# Patient Record
Sex: MALE | Race: WHITE | URBAN - METROPOLITAN AREA
[De-identification: names, ages, dates, MRNs, and addresses within clinical notes are randomized per-mention and may not be internally consistent; named-entity substitution may affect disease eponyms.]

---

## 2021-01-21 ENCOUNTER — OFFICE VISIT (OUTPATIENT)
Dept: URBAN - METROPOLITAN AREA CLINIC 27 | Facility: CLINIC | Age: 76
End: 2021-01-21
Payer: MEDICARE

## 2021-01-21 PROCEDURE — 67028 INJECTION EYE DRUG: CPT | Performed by: OPHTHALMOLOGY

## 2021-01-21 PROCEDURE — 92134 CPTRZ OPH DX IMG PST SGM RTA: CPT | Performed by: OPHTHALMOLOGY

## 2021-01-21 PROCEDURE — 92014 COMPRE OPH EXAM EST PT 1/>: CPT | Performed by: OPHTHALMOLOGY

## 2021-01-21 ASSESSMENT — INTRAOCULAR PRESSURE
OS: 16
OD: 17

## 2021-01-21 NOTE — IMPRESSION/PLAN
Impression: Nexdtve age-related mclr degn, left eye, intermed dry stage: H35.3122. OS. Status: Chronic. -FA from 01/09/20 demonstrates no active leakage Plan: Exam and OCT demonstrate stable drusen and RPE changes. The patient was advised to continue AREDS 2 vitamin supplements; the risk of smoking was emphasized with the patient. The patient was also advised to continue to use an [address] to monitor the vision and to call immediately with any changes.

## 2021-01-21 NOTE — IMPRESSION/PLAN
Impression: Exdtve age-rel mclr degn, right eye, with actv chrdl neovas: H35.3211. OD. Status: Symptomatic.
h/o avastin last 1/9/2020, [place] 12[place]
-FA from 01/09/20 demonstrates active leakage s/p Beovu 2/13/2020
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 Discussed PDT vs antiVEGF vs observation. Recommend Eylea today. R/B/A discussed and patient elects to proceed. Intravitreal Eylea injected today OD without complication.   

RTC: 4 weeks re-eval with OCT,OU/poss Eylea OD x AMD

## 2021-01-21 NOTE — IMPRESSION/PLAN
Impression: Dry eye syndrome of bilateral lacrimal glands: H04.123. Plan: Patient notes tearing. Exam demonstrates PEE and BLL ectropion. Discussed R/B/A of ATs, PFATs, Restasis, vs punctal plugs.  Rec ATs

## 2021-01-21 NOTE — IMPRESSION/PLAN
Impression: Age-related nuclear cataract, bilateral: H25.13. Plan: Patient notes glare. Exam demonstrates a mild- moderate cataract which has slowly progressed. Discussed R/B/A of surgery vs observation. Recommend observation. Warning symptoms discussed.

## 2021-01-21 NOTE — IMPRESSION/PLAN
Impression: Ptosis of eyelid: H02.409. Plan: Discussed that this may be starting to become visually significant; observe for now.

## 2021-02-18 ENCOUNTER — OFFICE VISIT (OUTPATIENT)
Dept: URBAN - METROPOLITAN AREA CLINIC 27 | Facility: CLINIC | Age: 76
End: 2021-02-18
Payer: MEDICARE

## 2021-02-18 PROCEDURE — 92134 CPTRZ OPH DX IMG PST SGM RTA: CPT | Performed by: OPHTHALMOLOGY

## 2021-02-18 PROCEDURE — 92012 INTRM OPH EXAM EST PATIENT: CPT | Performed by: OPHTHALMOLOGY

## 2021-02-18 PROCEDURE — 67028 INJECTION EYE DRUG: CPT | Performed by: OPHTHALMOLOGY

## 2021-02-18 ASSESSMENT — INTRAOCULAR PRESSURE
OS: 13
OD: 12

## 2021-02-18 NOTE — IMPRESSION/PLAN
Impression: Exdtve age-rel mclr degn, right eye, with actv chrdl neovas: H35.3211. OD. Status: Symptomatic.
h/o avastin last 1/9/2020, [place]
-FA from 01/09/20 demonstrates active leakage s/p Beovu 2/13/2020
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 Discussed PDT vs antiVEGF vs observation. Recommend Eylea today. R/B/A discussed and patient elects to proceed. Intravitreal Eylea injected today OD without complication.   

RTC: 4-5 weeks re-eval with OCT,OU/poss Eylea OD x AMD

## 2021-02-18 NOTE — IMPRESSION/PLAN
Impression: Nexdtve age-related mclr degn, left eye, intermed dry stage: H35.3122. OS. Status: Chronic. -FA from 01/09/20 demonstrates no active leakage Plan: Patient notes distortion in vision since last visit. Exam and OCT demonstrate stable drusen and RPE changes confirming AMD is still dry. The patient was advised to continue AREDS 2 vitamin supplements; the risk of smoking was emphasized with the patient. The patient was also advised to continue to use an [address] to monitor the vision and to call immediately with any changes. Will closely monitor given suspicious findings on OCT.

## 2021-02-18 NOTE — IMPRESSION/PLAN
Impression: Ptosis of eyelid: H02.409. Plan: Patient is noticing improvement in vision with manually lifting his eyelids. Discussed surgery vs observation.   Recommend evaluation with oculoplastics (either in [place] or when he comes back in the winter)

## 2021-02-18 NOTE — IMPRESSION/PLAN
Impression: Dry eye syndrome of bilateral lacrimal glands: H04.123. Plan: Patient notes improved tearing. Exam demonstrates PEE and BLL ectropion. Discussed R/B/A of ATs, PFATs, Restasis, vs punctal plugs.  Rec cont ATs

## 2021-03-24 ENCOUNTER — OFFICE VISIT (OUTPATIENT)
Dept: URBAN - METROPOLITAN AREA CLINIC 41 | Facility: CLINIC | Age: 76
End: 2021-03-24
Payer: MEDICARE

## 2021-03-24 PROCEDURE — 92012 INTRM OPH EXAM EST PATIENT: CPT | Performed by: OPHTHALMOLOGY

## 2021-03-24 PROCEDURE — 92134 CPTRZ OPH DX IMG PST SGM RTA: CPT | Performed by: OPHTHALMOLOGY

## 2021-03-24 PROCEDURE — 67028 INJECTION EYE DRUG: CPT | Performed by: OPHTHALMOLOGY

## 2021-03-24 ASSESSMENT — INTRAOCULAR PRESSURE
OS: 19
OD: 18

## 2021-03-24 NOTE — IMPRESSION/PLAN
Impression: Exdtve age-rel mclr degn, right eye, with actv chrdl neovas: H35.3211. OD. Status: Symptomatic.
h/o avastin last 1/9/2020, [place]
-FA from 01/09/20 demonstrates active leakage s/p Beovu 2/13/2020
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 Discussed PDT vs antiVEGF vs observation. Recommend Eylea today. R/B/A discussed and patient elects to proceed. Intravitreal Eylea injected today OD without complication.   

RTC: 4-5 weeks re-eval with OCT,OU/poss Eylea OD x AMD

## 2021-03-24 NOTE — IMPRESSION/PLAN
Impression: Nexdtve age-related mclr degn, left eye, intermed dry stage: H35.3122. OS. Status: Chronic. -FA from 01/09/20 demonstrates no active leakage Plan: Patient notes distortion in vision since last visit on 2/18/2021. Exam and OCT demonstrate stable drusen and RPE changes confirming AMD is still dry. The patient was advised to continue AREDS 2 vitamin supplements; the risk of smoking was emphasized with the patient. The patient was also advised to continue to use an [address] to monitor the vision and to call immediately with any changes. Will closely monitor given suspicious findings on OCT.

## 2021-03-24 NOTE — IMPRESSION/PLAN
Impression: Age-related nuclear cataract, bilateral: H25.13. Plan: Patient notes glare and vision tested worse today OD. Exam demonstrates a moderate cataract which has slowly progressed. Discussed R/B/A of surgery vs observation. Recommend observation. Warning symptoms discussed.

## 2022-02-03 ENCOUNTER — OFFICE VISIT (OUTPATIENT)
Dept: URBAN - METROPOLITAN AREA CLINIC 27 | Facility: CLINIC | Age: 77
End: 2022-02-03
Payer: MEDICARE

## 2022-02-03 DIAGNOSIS — H25.13 AGE-RELATED NUCLEAR CATARACT, BILATERAL: ICD-10-CM

## 2022-02-03 PROCEDURE — 67028 INJECTION EYE DRUG: CPT | Performed by: OPHTHALMOLOGY

## 2022-02-03 PROCEDURE — 92014 COMPRE OPH EXAM EST PT 1/>: CPT | Performed by: OPHTHALMOLOGY

## 2022-02-03 ASSESSMENT — INTRAOCULAR PRESSURE
OD: 12
OS: 13

## 2022-02-03 NOTE — IMPRESSION/PLAN
Impression: Nexdtve age-related mclr degn, left eye, intermed dry stage: H35.3122. OS. Status: Chronic. -FA from 01/09/20 demonstrates no active leakage Plan: Patient notes distortion in vision since last visit on 2/18/2021. Exam and OCT demonstrate stable drusen and RPE changes confirming AMD is still dry. The patient was advised to continue AREDS 2 vitamin supplements; the risk of smoking was emphasized with the patient. The patient was also advised to continue to use an [address] to monitor the vision and to call immediately with any changes. Will closely monitor given suspicious findings on OCT.

## 2022-02-03 NOTE — IMPRESSION/PLAN
Impression: Exdtve age-rel mclr degn, right eye, with actv chrdl neovas: H35.3211. OD. Status: Symptomatic. -FA from 01/09/20 demonstrates active leakage
-[place]
-s/p Avastin last 1/9/2020-RCA 
-s/p Beovu last 02/13/2020-RCA
-s/p Eylea [place] 12/23/2021[place]
-s/p Eylea last 03/24/2021-RCA Plan: Examination and OCT confirm with worse PED and SRF at ~ 6 wk tx interval.   Discussed PDT vs antiVEGF vs observation. Recommend Eylea today and taper to 4 wk follow up. R/B/A discussed and patient elects to proceed. Intravitreal Eylea injected today OD without complication.   

RTC: 4 weeks re-eval with OCT,OU/poss Eylea OD x AMD

## 2022-03-04 ENCOUNTER — OFFICE VISIT (OUTPATIENT)
Dept: URBAN - METROPOLITAN AREA CLINIC 41 | Facility: CLINIC | Age: 77
End: 2022-03-04
Payer: MEDICARE

## 2022-03-04 DIAGNOSIS — H35.3122 NEXDTVE AGE-RELATED MCLR DEGN, LEFT EYE, INTERMED DRY STAGE: ICD-10-CM

## 2022-03-04 DIAGNOSIS — H02.409 PTOSIS OF EYELID: ICD-10-CM

## 2022-03-04 DIAGNOSIS — T26.61XA CHEMICAL BURN OF CORNEA OF RIGHT EYE, INITIAL ENCOUNTER: ICD-10-CM

## 2022-03-04 PROCEDURE — 67028 INJECTION EYE DRUG: CPT | Performed by: OPHTHALMOLOGY

## 2022-03-04 PROCEDURE — 99214 OFFICE O/P EST MOD 30 MIN: CPT | Performed by: OPHTHALMOLOGY

## 2022-03-04 PROCEDURE — 92134 CPTRZ OPH DX IMG PST SGM RTA: CPT | Performed by: OPHTHALMOLOGY

## 2022-03-04 ASSESSMENT — INTRAOCULAR PRESSURE
OS: 18
OD: 17
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
## 2022-03-04 NOTE — IMPRESSION/PLAN
Impression: Nexdtve age-related mclr degn, left eye, intermed dry stage: H35.3122. OS. Status: Chronic. -FA from 01/09/20 demonstrates no active leakage Plan: Patient notes distortion in vision since last visit on 2/18/2021. Exam and OCT demonstrate stable drusen and RPE changes confirming AMD is still dry. The patient was advised to continue AREDS 2 vitamin supplements; the risk of smoking was emphasized with the patient. The patient was also advised to continue to use an [address] to monitor the vision and to call immediately with any changes. Will closely monitor given suspicious findings on OCT.

## 2022-03-04 NOTE — IMPRESSION/PLAN
Impression: Exdtve age-rel mclr degn, right eye, with actv chrdl neovas: H35.3211. OD. Status: Symptomatic. -FA from 01/09/20 demonstrates active leakage
-[place]ast[place]
-[place]/p Avastin last 1/9/2020-RCA 
-s/p Beovu last 02/13/2020-RCA
-s/p Eylea [place] 12/23/2021[place]
-s/p Eylea last 02/03/2022-RCA Plan: Examination and OCT confirm with worse PED and SRF at ~ 6 wk tx interval.   Discussed PDT vs antiVEGF vs observation. Recommend Eylea today and taper to 4 wk follow up. R/B/A discussed and patient elects to proceed. Intravitreal Eylea injected today OD without complication.   

RTC: 4 weeks re-eval with OCT,OU/poss Eylea OD x AMD
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
## 2022-04-01 ENCOUNTER — OFFICE VISIT (OUTPATIENT)
Dept: URBAN - METROPOLITAN AREA CLINIC 41 | Facility: CLINIC | Age: 77
End: 2022-04-01
Payer: MEDICARE

## 2022-04-01 DIAGNOSIS — H04.123 DRY EYE SYNDROME OF BILATERAL LACRIMAL GLANDS: ICD-10-CM

## 2022-04-01 DIAGNOSIS — H35.3211 EXDTVE AGE-REL MCLR DEGN, RIGHT EYE, WITH ACTV CHRDL NEOVAS: Primary | ICD-10-CM

## 2022-04-01 PROCEDURE — 92014 COMPRE OPH EXAM EST PT 1/>: CPT | Performed by: OPHTHALMOLOGY

## 2022-04-01 PROCEDURE — 92134 CPTRZ OPH DX IMG PST SGM RTA: CPT | Performed by: OPHTHALMOLOGY

## 2022-04-01 PROCEDURE — 67028 INJECTION EYE DRUG: CPT | Performed by: OPHTHALMOLOGY

## 2022-04-01 ASSESSMENT — INTRAOCULAR PRESSURE
OS: 15
OD: 18

## 2022-04-01 NOTE — IMPRESSION/PLAN
Impression: Exdtve age-rel mclr degn, right eye, with actv chrdl neovas: H35.3211. OD. Status: Symptomatic. -FA from 01/09/20 demonstrates active leakage
-[place]ast[place]
-s/p Avastin last 1/9/2020-RCA 
-s/p Beovu last 02/13/2020-RCA
-s/p Eylea [place] 12/23/2021[place]
-s/p Eylea last 03/04/2022-RCA Plan: Examination and OCT confirm with improved PED and SRF tapering from 6 wks to 4 wk tx interval.   Discussed PDT vs antiVEGF vs observation. Recommend Eylea today and maintain 4 wk follow up until SRF is completely resolved. R/B/A discussed and patient elects to proceed. Intravitreal Eylea injected today OD without complication.   

RTC: 4 weeks re-eval with OCT,OU/poss Eylea OD x AMD (WILL BE BACK HOME IN [place])
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
## 2022-04-01 NOTE — IMPRESSION/PLAN
Impression: Nexdtve age-related mclr degn, left eye, intermed dry stage: H35.3122. OS. Status: Chronic. -FA from 01/09/20 demonstrates no active leakage Plan: Patient notes distortion in vision since last visit on 2/18/2021. Exam and OCT demonstrate stable drusen and RPE changes confirming AMD is still dry. The patient was advised to continue AREDS 2 vitamin supplements; the risk of smoking was emphasized with the patient. The patient was also advised to continue to use an [address] to monitor the vision and to call immediately with any changes. Will closely monitor given suspicious findings on OCT.

## 2022-04-01 NOTE — IMPRESSION/PLAN
Impression: Age-related nuclear cataract, bilateral: H25.13. Plan: Patient notes glare and vision tested better today OD, though still borderline. Exam demonstrates a moderate cataract which has slowly progressed. Discussed R/B/A of surgery vs observation. Recommend observation and evaluation back home in [place]. Warning symptoms discussed.

## 2023-01-13 ENCOUNTER — OFFICE VISIT (OUTPATIENT)
Dept: URBAN - METROPOLITAN AREA CLINIC 41 | Facility: CLINIC | Age: 78
End: 2023-01-13
Payer: MEDICARE

## 2023-01-13 DIAGNOSIS — T26.61XA CHEMICAL BURN OF CORNEA OF RIGHT EYE, INITIAL ENCOUNTER: ICD-10-CM

## 2023-01-13 DIAGNOSIS — H25.13 AGE-RELATED NUCLEAR CATARACT, BILATERAL: ICD-10-CM

## 2023-01-13 DIAGNOSIS — H02.409 PTOSIS OF EYELID: ICD-10-CM

## 2023-01-13 DIAGNOSIS — H35.3122 NEXDTVE AGE-RELATED MCLR DEGN, LEFT EYE, INTERMED DRY STAGE: Primary | ICD-10-CM

## 2023-01-13 DIAGNOSIS — H35.3211 EXDTVE AGE-REL MCLR DEGN, RIGHT EYE, WITH ACTV CHRDL NEOVAS: ICD-10-CM

## 2023-01-13 PROCEDURE — 67028 INJECTION EYE DRUG: CPT | Performed by: OPHTHALMOLOGY

## 2023-01-13 PROCEDURE — 92134 CPTRZ OPH DX IMG PST SGM RTA: CPT | Performed by: OPHTHALMOLOGY

## 2023-01-13 PROCEDURE — 92014 COMPRE OPH EXAM EST PT 1/>: CPT | Performed by: OPHTHALMOLOGY

## 2023-01-13 ASSESSMENT — INTRAOCULAR PRESSURE
OS: 15
OD: 19

## 2023-01-13 NOTE — IMPRESSION/PLAN
Impression: Nexdtve age-related mclr degn, left eye, intermed dry stage: H35.3122. OS. Status: Chronic. -FA from 01/09/20 demonstrates no active leakage Plan: Patient notes distortion in vision since last visit on 2/18/2021. Exam and OCT demonstrate stable drusen and RPE changes confirming AMD is still dry. The patient was advised to continue AREDS 2 vitamin supplements; the risk of smoking was emphasized with the patient. The patient was also advised to continue to use an [address] to monitor the vision and to call immediately with any changes. Will closely monitor given suspicious findings on OCT.

## 2023-01-13 NOTE — IMPRESSION/PLAN
Impression: Exdtve age-rel mclr degn, right eye, with actv chrdl neovas: H35.3211. OD. Status: Symptomatic. -FA from 01/09/20 demonstrates active leakage
-[place]2022[place]
-s/p Eylea last 04/01/2022-RCA
-[place]
-s/p Avastin last 1/9/2020-RCA 
-s/p Beovu last 02/13/2020-RCA Plan: Patient returns from [place]. Last tx ~ 4 wks ago. Examination and OCT confirm with significantly worse SRF. In past, he had improved PED and SRF tapering from 6 wks to 4 wk tx interval.   Discussed PDT vs antiVEGF vs observation. Recommend Eylea today and maintain 4 wk follow up until SRF is completely resolved. R/B/A discussed and patient elects to proceed. Intravitreal Eylea injected today OD without complication.   

RTC: 4 weeks re-eval with OCT,OU/poss Eylea OD vs Vabysmo OD x AMD (WILL BE BACK HOME IN [place])

## 2023-01-13 NOTE — IMPRESSION/PLAN
Impression: Age-related nuclear cataract, bilateral: H25.13. Plan: Patient notes glare and vision tested worse today OD, though still borderline. Exam demonstrates a moderate cataract which has slowly progressed. Discussed R/B/A of surgery vs observation. Recommend observation and evaluation back home in [place]. Warning symptoms discussed.

## 2023-02-13 ENCOUNTER — OFFICE VISIT (OUTPATIENT)
Dept: URBAN - METROPOLITAN AREA CLINIC 41 | Facility: CLINIC | Age: 78
End: 2023-02-13
Payer: MEDICARE

## 2023-02-13 DIAGNOSIS — H35.3211 EXDTVE AGE-REL MCLR DEGN, RIGHT EYE, WITH ACTV CHRDL NEOVAS: ICD-10-CM

## 2023-02-13 DIAGNOSIS — H25.13 AGE-RELATED NUCLEAR CATARACT, BILATERAL: ICD-10-CM

## 2023-02-13 DIAGNOSIS — H35.3122 NEXDTVE AGE-RELATED MCLR DEGN, LEFT EYE, INTERMED DRY STAGE: Primary | ICD-10-CM

## 2023-02-13 DIAGNOSIS — T26.61XA CHEMICAL BURN OF CORNEA OF RIGHT EYE, INITIAL ENCOUNTER: ICD-10-CM

## 2023-02-13 DIAGNOSIS — H02.409 PTOSIS OF EYELID: ICD-10-CM

## 2023-02-13 PROCEDURE — 92012 INTRM OPH EXAM EST PATIENT: CPT | Performed by: OPHTHALMOLOGY

## 2023-02-13 PROCEDURE — 67028 INJECTION EYE DRUG: CPT | Performed by: OPHTHALMOLOGY

## 2023-02-13 PROCEDURE — 92134 CPTRZ OPH DX IMG PST SGM RTA: CPT | Performed by: OPHTHALMOLOGY

## 2023-02-13 ASSESSMENT — INTRAOCULAR PRESSURE
OD: 20
OS: 14

## 2023-02-13 NOTE — IMPRESSION/PLAN
Impression: Exdtve age-rel mclr degn, right eye, with actv chrdl neovas: H35.3211. OD. Status: Symptomatic. -FA from 01/09/20 demonstrates active leakage
-[place]2022[place]
-s/p Eylea last 01/13/2023-RCA
-[place]ast[place]
-s/p Avastin last 1/9/2020-RCA 
-s/p Beovu last 02/13/2020-RCA Plan: Last tx ~ 4 wks ago with Eylea. Examination and OCT confirm with significantly worse SRF on Eylea. In past, he had improved PED and SRF tapering from 6 wks to 4 wk tx interval.   Discussed PDT vs antiVEGF vs observation. Recommend switch to Vabysmo today and maintain 4 wk follow up until SRF is completely resolved. R/B/A discussed and patient elects to proceed. Intravitreal Vabysmo injected today OD without complication.   

RTC: 4-5 weeks re-eval with OCT,OU/poss Eylea OD vs Vabysmo OD x AMD (WILL BE BACK HOME IN [place] for summer)

## 2023-02-13 NOTE — IMPRESSION/PLAN
Impression: Nexdtve age-related mclr degn, left eye, intermed dry stage: H35.3122. OS. Status: Chronic. -FA from 01/09/20 demonstrates no active leakage Plan: Patient notes distortion in vision since last visit on 2/18/2021. Exam and OCT demonstrate stable drusen and RPE changes confirming AMD is still dry. The patient was advised to continue AREDS 2 vitamin supplements; the risk of smoking was emphasized with the patient. The patient was also advised to continue to use an [address] to monitor the vision and to call immediately with any changes. Will closely monitor given suspicious findings on OCT.

## 2023-03-20 ENCOUNTER — OFFICE VISIT (OUTPATIENT)
Dept: URBAN - METROPOLITAN AREA CLINIC 36 | Facility: CLINIC | Age: 78
End: 2023-03-20
Payer: COMMERCIAL

## 2023-03-20 DIAGNOSIS — H02.409 PTOSIS OF EYELID: ICD-10-CM

## 2023-03-20 DIAGNOSIS — H25.13 AGE-RELATED NUCLEAR CATARACT, BILATERAL: ICD-10-CM

## 2023-03-20 DIAGNOSIS — T26.61XA CHEMICAL BURN OF CORNEA OF RIGHT EYE, INITIAL ENCOUNTER: ICD-10-CM

## 2023-03-20 DIAGNOSIS — H35.3122 NEXDTVE AGE-RELATED MCLR DEGN, LEFT EYE, INTERMED DRY STAGE: Primary | ICD-10-CM

## 2023-03-20 DIAGNOSIS — H35.3211 EXDTVE AGE-REL MCLR DEGN, RIGHT EYE, WITH ACTV CHRDL NEOVAS: ICD-10-CM

## 2023-03-20 PROCEDURE — 92134 CPTRZ OPH DX IMG PST SGM RTA: CPT | Performed by: OPHTHALMOLOGY

## 2023-03-20 PROCEDURE — 67028 INJECTION EYE DRUG: CPT | Performed by: OPHTHALMOLOGY

## 2023-03-20 PROCEDURE — 92014 COMPRE OPH EXAM EST PT 1/>: CPT | Performed by: OPHTHALMOLOGY

## 2023-03-20 ASSESSMENT — INTRAOCULAR PRESSURE
OS: 20
OD: 21

## 2023-03-20 NOTE — IMPRESSION/PLAN
Impression: Nexdtve age-related mclr degn, left eye, intermed dry stage: H35.3122. OS. Status: Chronic. -FA from 01/09/20 demonstrates no active leakage Plan: Patient notes distortion in vision since last visit on 2/18/2021. Exam and OCT demonstrate stable drusen and RPE changes confirming AMD is still dry. The patient was advised to continue AREDS 2 vitamin supplements; the risk of smoking was emphasized with the patient. The patient was also advised to continue to use an [address] to monitor the vision and to call immediately with any changes. Will closely monitor given suspicious findings on OCT.

## 2023-03-20 NOTE — IMPRESSION/PLAN
Impression: Age-related nuclear cataract, bilateral: H25.13. Plan: Patient notes glare and vision improved today OD with better control of his AMD.  Exam demonstrates a moderate cataract which has slowly progressed. Discussed R/B/A of surgery vs observation. Recommend observation and evaluation back home in [place]. Warning symptoms discussed.

## 2023-03-20 NOTE — IMPRESSION/PLAN
Impression: Exdtve age-rel mclr degn, right eye, with actv chrdl neovas: H35.3211. OD. Status: Symptomatic. -FA from 01/09/20 demonstrates active leakage
-[place]yle[place]2022[place]
-s/p Eylea last 01/13/2023-RCA
-[place]ast[place]
-s/p Avastin last 1/9/2020-RCA 
-s/p Beovu last 02/13/2020-RCA Plan: Last tx ~ 5 wks ago after switch to Vabysmo. Examination and OCT confirm with resolved SRF after switch to Vabysmo. Discussed PDT vs antiVEGF vs observation. Recommend repeat Vabysmo today and maintain 4-5 wk follow up. R/B/A discussed and patient elects to proceed. Intravitreal Vabysmo injected today OD without complication.   

RTC: 4-5 weeks re-eval with OCT,OU/poss Vabysmo OD x AMD (WILL BE BACK HOME IN [place] for summer)

## 2024-02-12 ENCOUNTER — OFFICE VISIT (OUTPATIENT)
Dept: URBAN - METROPOLITAN AREA CLINIC 41 | Facility: CLINIC | Age: 79
End: 2024-02-12
Payer: COMMERCIAL

## 2024-02-12 DIAGNOSIS — H35.3122 NONEXUDATIVE AGE-RELATED MACULAR DEGENERATION, LEFT EYE, INTERMEDIATE DRY STAGE: Primary | ICD-10-CM

## 2024-02-12 DIAGNOSIS — H02.409 PTOSIS OF EYELID: ICD-10-CM

## 2024-02-12 DIAGNOSIS — H25.13 AGE-RELATED NUCLEAR CATARACT, BILATERAL: ICD-10-CM

## 2024-02-12 DIAGNOSIS — H35.3211 EXUDATIVE AGE-RELATED MACULAR DEGENERATION, RIGHT EYE, WITH ACTIVE CHOROIDAL NEOVASCULARIZATION: ICD-10-CM

## 2024-02-12 PROCEDURE — 92134 CPTRZ OPH DX IMG PST SGM RTA: CPT | Performed by: OPHTHALMOLOGY

## 2024-02-12 PROCEDURE — 67028 INJECTION EYE DRUG: CPT | Performed by: OPHTHALMOLOGY

## 2024-02-12 PROCEDURE — 92014 COMPRE OPH EXAM EST PT 1/>: CPT | Performed by: OPHTHALMOLOGY

## 2024-02-12 ASSESSMENT — INTRAOCULAR PRESSURE
OS: 17
OD: 16

## 2024-03-01 ENCOUNTER — OFFICE VISIT (OUTPATIENT)
Dept: URBAN - METROPOLITAN AREA CLINIC 41 | Facility: CLINIC | Age: 79
End: 2024-03-01
Payer: MEDICARE

## 2024-03-01 DIAGNOSIS — H02.409 PTOSIS OF EYELID: ICD-10-CM

## 2024-03-01 DIAGNOSIS — H35.3211 EXUDATIVE AGE-RELATED MACULAR DEGENERATION, RIGHT EYE, WITH ACTIVE CHOROIDAL NEOVASCULARIZATION: Primary | ICD-10-CM

## 2024-03-01 DIAGNOSIS — H35.3122 NEXDTVE AGE-RELATED MCLR DEGN, LEFT EYE, INTERMED DRY STAGE: ICD-10-CM

## 2024-03-01 DIAGNOSIS — H25.13 AGE-RELATED NUCLEAR CATARACT, BILATERAL: ICD-10-CM

## 2024-03-01 PROCEDURE — 92134 CPTRZ OPH DX IMG PST SGM RTA: CPT | Performed by: OPHTHALMOLOGY

## 2024-03-01 PROCEDURE — 92014 COMPRE OPH EXAM EST PT 1/>: CPT | Performed by: OPHTHALMOLOGY

## 2024-03-01 RX ORDER — MOXIFLOXACIN 5 MG/ML
0.5 % SOLUTION/ DROPS OPHTHALMIC
Qty: 5 | Refills: 2 | Status: INACTIVE
Start: 2024-03-01 | End: 2024-03-01

## 2024-03-01 RX ORDER — PREDNISOLONE ACETATE 10 MG/ML
1 % SUSPENSION/ DROPS OPHTHALMIC
Qty: 5 | Refills: 2 | Status: INACTIVE
Start: 2024-03-01 | End: 2024-03-01

## 2024-03-01 RX ORDER — MOXIFLOXACIN 5 MG/ML
0.5 % SOLUTION/ DROPS OPHTHALMIC
Qty: 5 | Refills: 2 | Status: INACTIVE
Start: 2024-03-01 | End: 2024-06-01

## 2024-03-01 RX ORDER — PREDNISOLONE ACETATE 10 MG/ML
1 % SUSPENSION/ DROPS OPHTHALMIC
Qty: 5 | Refills: 2 | Status: INACTIVE
Start: 2024-03-01 | End: 2024-05-01

## 2024-03-01 ASSESSMENT — INTRAOCULAR PRESSURE
OD: 7
OS: 8

## 2024-03-08 ENCOUNTER — OFFICE VISIT (OUTPATIENT)
Dept: URBAN - METROPOLITAN AREA CLINIC 41 | Facility: CLINIC | Age: 79
End: 2024-03-08
Payer: MEDICARE

## 2024-03-08 DIAGNOSIS — H35.3211 EXUDATIVE AGE-RELATED MACULAR DEGENERATION, RIGHT EYE, WITH ACTIVE CHOROIDAL NEOVASCULARIZATION: Primary | ICD-10-CM

## 2024-03-08 DIAGNOSIS — H02.409 PTOSIS OF EYELID: ICD-10-CM

## 2024-03-08 DIAGNOSIS — H25.13 AGE-RELATED NUCLEAR CATARACT, BILATERAL: ICD-10-CM

## 2024-03-08 DIAGNOSIS — H35.3122 NEXDTVE AGE-RELATED MCLR DEGN, LEFT EYE, INTERMED DRY STAGE: ICD-10-CM

## 2024-03-08 PROCEDURE — 92134 CPTRZ OPH DX IMG PST SGM RTA: CPT | Performed by: OPHTHALMOLOGY

## 2024-03-08 PROCEDURE — 92012 INTRM OPH EXAM EST PATIENT: CPT | Performed by: OPHTHALMOLOGY

## 2024-03-08 ASSESSMENT — INTRAOCULAR PRESSURE
OD: 23
OS: 10

## 2024-03-25 ENCOUNTER — OFFICE VISIT (OUTPATIENT)
Dept: URBAN - METROPOLITAN AREA CLINIC 41 | Facility: CLINIC | Age: 79
End: 2024-03-25
Payer: MEDICARE

## 2024-03-25 DIAGNOSIS — H02.409 PTOSIS OF EYELID: ICD-10-CM

## 2024-03-25 DIAGNOSIS — H25.13 AGE-RELATED NUCLEAR CATARACT, BILATERAL: ICD-10-CM

## 2024-03-25 DIAGNOSIS — H35.3122 NEXDTVE AGE-RELATED MCLR DEGN, LEFT EYE, INTERMED DRY STAGE: ICD-10-CM

## 2024-03-25 DIAGNOSIS — H35.3211 EXUDATIVE AGE-RELATED MACULAR DEGENERATION, RIGHT EYE, WITH ACTIVE CHOROIDAL NEOVASCULARIZATION: Primary | ICD-10-CM

## 2024-03-25 PROCEDURE — 67028 INJECTION EYE DRUG: CPT | Performed by: OPHTHALMOLOGY

## 2024-03-25 PROCEDURE — 92134 CPTRZ OPH DX IMG PST SGM RTA: CPT | Performed by: OPHTHALMOLOGY

## 2024-03-25 PROCEDURE — 99214 OFFICE O/P EST MOD 30 MIN: CPT | Performed by: OPHTHALMOLOGY

## 2024-03-25 ASSESSMENT — INTRAOCULAR PRESSURE
OS: 19
OD: 26

## 2024-12-19 ENCOUNTER — OFFICE VISIT (OUTPATIENT)
Dept: URBAN - METROPOLITAN AREA CLINIC 27 | Facility: CLINIC | Age: 79
End: 2024-12-19
Payer: MEDICARE

## 2024-12-19 DIAGNOSIS — H43.811 VITREOUS DEGENERATION, RIGHT EYE: ICD-10-CM

## 2024-12-19 DIAGNOSIS — H35.3211 EXUDATIVE AGE-RELATED MACULAR DEGENERATION, RIGHT EYE, WITH ACTIVE CHOROIDAL NEOVASCULARIZATION: ICD-10-CM

## 2024-12-19 DIAGNOSIS — H35.3231 EXUDATIVE AGE-REL MCLR DEGN, BI, WITH ACTV CHRDL NEOVAS: ICD-10-CM

## 2024-12-19 DIAGNOSIS — H02.409 PTOSIS OF EYELID: Primary | ICD-10-CM

## 2024-12-19 DIAGNOSIS — Z96.1 PRESENCE OF INTRAOCULAR LENS: ICD-10-CM

## 2024-12-19 PROCEDURE — 92134 CPTRZ OPH DX IMG PST SGM RTA: CPT | Performed by: OPHTHALMOLOGY

## 2024-12-19 PROCEDURE — 99214 OFFICE O/P EST MOD 30 MIN: CPT | Performed by: OPHTHALMOLOGY

## 2024-12-19 ASSESSMENT — INTRAOCULAR PRESSURE
OS: 15
OD: 14

## 2025-02-06 ENCOUNTER — OFFICE VISIT (OUTPATIENT)
Dept: URBAN - METROPOLITAN AREA CLINIC 41 | Facility: CLINIC | Age: 80
End: 2025-02-06
Payer: MEDICARE

## 2025-02-06 DIAGNOSIS — H35.3231 EXUDATIVE AGE-REL MCLR DEGN, BI, WITH ACTV CHRDL NEOVAS: Primary | ICD-10-CM

## 2025-02-06 DIAGNOSIS — H43.811 VITREOUS DEGENERATION, RIGHT EYE: ICD-10-CM

## 2025-02-06 DIAGNOSIS — Z96.1 PRESENCE OF INTRAOCULAR LENS: ICD-10-CM

## 2025-02-06 PROCEDURE — 92134 CPTRZ OPH DX IMG PST SGM RTA: CPT | Performed by: STUDENT IN AN ORGANIZED HEALTH CARE EDUCATION/TRAINING PROGRAM

## 2025-02-06 PROCEDURE — 99213 OFFICE O/P EST LOW 20 MIN: CPT | Performed by: STUDENT IN AN ORGANIZED HEALTH CARE EDUCATION/TRAINING PROGRAM

## 2025-02-06 ASSESSMENT — INTRAOCULAR PRESSURE
OD: 10
OS: 11

## 2025-03-17 ENCOUNTER — OFFICE VISIT (OUTPATIENT)
Dept: URBAN - METROPOLITAN AREA CLINIC 41 | Facility: CLINIC | Age: 80
End: 2025-03-17
Payer: MEDICARE

## 2025-03-17 DIAGNOSIS — H35.3231 EXUDATIVE AGE-RELATED MACULAR DEGENERATION, BILATERAL, WITH ACTIVE CHOROIDAL NEOVASCULARIZATION: Primary | ICD-10-CM

## 2025-03-17 PROCEDURE — 92134 CPTRZ OPH DX IMG PST SGM RTA: CPT | Performed by: STUDENT IN AN ORGANIZED HEALTH CARE EDUCATION/TRAINING PROGRAM

## 2025-03-17 ASSESSMENT — INTRAOCULAR PRESSURE
OS: 12
OD: 13